# Patient Record
Sex: FEMALE | Race: WHITE | Employment: OTHER | URBAN - METROPOLITAN AREA
[De-identification: names, ages, dates, MRNs, and addresses within clinical notes are randomized per-mention and may not be internally consistent; named-entity substitution may affect disease eponyms.]

---

## 2017-02-08 PROBLEM — E89.0 POSTOPERATIVE HYPOTHYROIDISM: Status: ACTIVE | Noted: 2017-02-08

## 2017-02-08 PROBLEM — E04.2 MULTINODULAR GOITER: Status: ACTIVE | Noted: 2017-02-08

## 2017-04-12 ENCOUNTER — HOSPITAL ENCOUNTER (OUTPATIENT)
Dept: GENERAL RADIOLOGY | Age: 77
Discharge: HOME | End: 2017-04-12
Payer: MEDICARE

## 2017-04-12 DIAGNOSIS — M43.16 SPONDYLOLISTHESIS AT L4-L5 LEVEL: ICD-10-CM

## 2017-04-12 PROCEDURE — 72114 X-RAY EXAM L-S SPINE BENDING: CPT

## 2017-04-12 PROCEDURE — 72110 X-RAY EXAM L-2 SPINE 4/>VWS: CPT

## 2017-05-25 PROBLEM — M21.6X2 ACQUIRED METATARSUS ADDUCTUS OF LEFT FOOT: Status: ACTIVE | Noted: 2017-05-25

## 2017-05-25 PROBLEM — M25.572 SINUS TARSI SYNDROME OF LEFT FOOT: Status: ACTIVE | Noted: 2017-05-25

## 2017-05-31 PROBLEM — M77.50 OTHER ENTHESOPATHY OF UNSPECIFIED FOOT: Status: ACTIVE | Noted: 2017-05-31

## 2017-05-31 PROBLEM — E11.40 CONTROLLED TYPE 2 DIABETES WITH NEUROPATHY (HCC): Status: ACTIVE | Noted: 2017-05-31

## 2017-05-31 PROBLEM — M67.40 GANGLION: Status: ACTIVE | Noted: 2017-05-31

## 2017-05-31 PROBLEM — G57.52 TARSAL TUNNEL SYNDROME, LEFT LOWER LIMB: Status: ACTIVE | Noted: 2017-05-31

## 2017-05-31 PROBLEM — M13.872 OTHER SPECIFIED ARTHRITIS, LEFT ANKLE AND FOOT: Status: ACTIVE | Noted: 2017-05-31

## 2017-05-31 PROBLEM — M76.822 POSTERIOR TIBIAL TENDINITIS OF LEFT LEG: Status: ACTIVE | Noted: 2017-05-31

## 2017-06-17 PROBLEM — R41.3 MEMORY DIFFICULTIES: Status: ACTIVE | Noted: 2017-06-17

## 2017-12-29 ENCOUNTER — HOSPITAL ENCOUNTER (OUTPATIENT)
Dept: MAMMOGRAPHY | Age: 77
Discharge: HOME | End: 2017-12-29
Attending: FAMILY MEDICINE
Payer: MEDICARE

## 2017-12-29 DIAGNOSIS — Z12.31 VISIT FOR SCREENING MAMMOGRAM: ICD-10-CM

## 2017-12-29 PROCEDURE — 77067 SCR MAMMO BI INCL CAD: CPT

## 2018-01-04 PROBLEM — E11.42 CONTROLLED TYPE 2 DIABETES MELLITUS WITH DIABETIC POLYNEUROPATHY, WITHOUT LONG-TERM CURRENT USE OF INSULIN (HCC): Status: ACTIVE | Noted: 2017-05-31

## 2018-03-15 PROBLEM — E11.21 TYPE 2 DIABETES WITH NEPHROPATHY (HCC): Status: ACTIVE | Noted: 2018-03-15

## 2018-03-15 PROBLEM — J45.30 MILD PERSISTENT ASTHMA WITHOUT COMPLICATION: Status: ACTIVE | Noted: 2018-03-15

## 2018-03-15 PROBLEM — L30.9 ECZEMA: Status: ACTIVE | Noted: 2018-03-15

## 2018-06-28 PROBLEM — N18.2 STAGE 2 CHRONIC KIDNEY DISEASE: Status: ACTIVE | Noted: 2018-06-28

## 2018-06-28 PROBLEM — E11.22 TYPE 2 DIABETES MELLITUS WITH CHRONIC KIDNEY DISEASE, WITHOUT LONG-TERM CURRENT USE OF INSULIN (HCC): Status: ACTIVE | Noted: 2018-03-15

## 2018-06-28 PROBLEM — G31.84 MILD COGNITIVE IMPAIRMENT: Status: ACTIVE | Noted: 2018-06-28

## 2018-08-29 PROBLEM — N18.2 TYPE 2 DIABETES MELLITUS WITH STAGE 2 CHRONIC KIDNEY DISEASE, WITHOUT LONG-TERM CURRENT USE OF INSULIN (HCC): Status: ACTIVE | Noted: 2018-03-15

## 2018-08-29 PROBLEM — M21.6X1 ACQUIRED BILATERAL METATARSUS ADDUCTUS: Status: ACTIVE | Noted: 2017-05-25

## 2018-10-22 PROBLEM — R09.89 GLOBUS SENSATION: Status: ACTIVE | Noted: 2018-10-22

## 2018-10-22 PROBLEM — Z23 NEED FOR SHINGLES VACCINE: Status: ACTIVE | Noted: 2018-10-22

## 2018-12-29 PROBLEM — E11.42 CONTROLLED TYPE 2 DIABETES MELLITUS WITH DIABETIC POLYNEUROPATHY, WITHOUT LONG-TERM CURRENT USE OF INSULIN (HCC): Status: RESOLVED | Noted: 2017-05-31 | Resolved: 2018-12-29

## 2019-01-02 PROBLEM — E11.40 TYPE 2 DIABETES MELLITUS WITH DIABETIC NEUROPATHY (HCC): Status: ACTIVE | Noted: 2019-01-02

## 2019-02-02 PROBLEM — M54.50 BILATERAL LOW BACK PAIN WITHOUT SCIATICA: Status: ACTIVE | Noted: 2019-02-02

## 2019-03-03 ENCOUNTER — HOSPITAL ENCOUNTER (EMERGENCY)
Age: 79
Discharge: HOME | End: 2019-03-03
Attending: EMERGENCY MEDICINE
Payer: MEDICARE

## 2019-03-03 ENCOUNTER — APPOINTMENT (OUTPATIENT)
Dept: GENERAL RADIOLOGY | Age: 79
End: 2019-03-03
Attending: EMERGENCY MEDICINE
Payer: MEDICARE

## 2019-03-03 VITALS
DIASTOLIC BLOOD PRESSURE: 58 MMHG | HEART RATE: 80 BPM | OXYGEN SATURATION: 96 % | BODY MASS INDEX: 40.32 KG/M2 | TEMPERATURE: 98.8 F | SYSTOLIC BLOOD PRESSURE: 115 MMHG | WEIGHT: 200 LBS | HEIGHT: 59 IN | RESPIRATION RATE: 18 BRPM

## 2019-03-03 DIAGNOSIS — R11.2 NAUSEA AND VOMITING, INTRACTABILITY OF VOMITING NOT SPECIFIED, UNSPECIFIED VOMITING TYPE: ICD-10-CM

## 2019-03-03 DIAGNOSIS — J06.9 ACUTE URI: Primary | ICD-10-CM

## 2019-03-03 LAB
ALBUMIN SERPL-MCNC: 3.2 G/DL (ref 3.2–4.6)
ALBUMIN/GLOB SERPL: 0.8 {RATIO} (ref 1.2–3.5)
ALP SERPL-CCNC: 58 U/L (ref 50–136)
ALT SERPL-CCNC: 70 U/L (ref 12–65)
ANION GAP SERPL CALC-SCNC: 8 MMOL/L (ref 7–16)
AST SERPL-CCNC: 92 U/L (ref 15–37)
BASOPHILS # BLD: 0 K/UL (ref 0–0.2)
BASOPHILS NFR BLD: 0 % (ref 0–2)
BILIRUB SERPL-MCNC: 0.5 MG/DL (ref 0.2–1.1)
BUN SERPL-MCNC: 19 MG/DL (ref 8–23)
CALCIUM SERPL-MCNC: 9 MG/DL (ref 8.3–10.4)
CHLORIDE SERPL-SCNC: 99 MMOL/L (ref 98–107)
CO2 SERPL-SCNC: 26 MMOL/L (ref 21–32)
CREAT SERPL-MCNC: 1.03 MG/DL (ref 0.6–1)
DIFFERENTIAL METHOD BLD: ABNORMAL
EOSINOPHIL # BLD: 0 K/UL (ref 0–0.8)
EOSINOPHIL NFR BLD: 1 % (ref 0.5–7.8)
ERYTHROCYTE [DISTWIDTH] IN BLOOD BY AUTOMATED COUNT: 16.2 % (ref 11.9–14.6)
GLOBULIN SER CALC-MCNC: 4.2 G/DL (ref 2.3–3.5)
GLUCOSE SERPL-MCNC: 120 MG/DL (ref 65–100)
HCT VFR BLD AUTO: 38.2 % (ref 35.8–46.3)
HGB BLD-MCNC: 13 G/DL (ref 11.7–15.4)
IMM GRANULOCYTES # BLD AUTO: 0 K/UL (ref 0–0.5)
IMM GRANULOCYTES NFR BLD AUTO: 1 % (ref 0–5)
LIPASE SERPL-CCNC: 179 U/L (ref 73–393)
LYMPHOCYTES # BLD: 1.5 K/UL (ref 0.5–4.6)
LYMPHOCYTES NFR BLD: 38 % (ref 13–44)
MCH RBC QN AUTO: 28.9 PG (ref 26.1–32.9)
MCHC RBC AUTO-ENTMCNC: 34 G/DL (ref 31.4–35)
MCV RBC AUTO: 84.9 FL (ref 79.6–97.8)
MONOCYTES # BLD: 0.6 K/UL (ref 0.1–1.3)
MONOCYTES NFR BLD: 16 % (ref 4–12)
NEUTS SEG # BLD: 1.7 K/UL (ref 1.7–8.2)
NEUTS SEG NFR BLD: 43 % (ref 43–78)
NRBC # BLD: 0 K/UL (ref 0–0.2)
PLATELET # BLD AUTO: 213 K/UL (ref 150–450)
PMV BLD AUTO: 9.3 FL (ref 9.4–12.3)
POTASSIUM SERPL-SCNC: 3.4 MMOL/L (ref 3.5–5.1)
PROT SERPL-MCNC: 7.4 G/DL (ref 6.3–8.2)
RBC # BLD AUTO: 4.5 M/UL (ref 4.05–5.2)
SODIUM SERPL-SCNC: 133 MMOL/L (ref 136–145)
WBC # BLD AUTO: 3.9 K/UL (ref 4.3–11.1)

## 2019-03-03 PROCEDURE — 83690 ASSAY OF LIPASE: CPT

## 2019-03-03 PROCEDURE — 74011250636 HC RX REV CODE- 250/636: Performed by: EMERGENCY MEDICINE

## 2019-03-03 PROCEDURE — 85025 COMPLETE CBC W/AUTO DIFF WBC: CPT

## 2019-03-03 PROCEDURE — 80053 COMPREHEN METABOLIC PANEL: CPT

## 2019-03-03 PROCEDURE — 71046 X-RAY EXAM CHEST 2 VIEWS: CPT

## 2019-03-03 PROCEDURE — 96374 THER/PROPH/DIAG INJ IV PUSH: CPT | Performed by: EMERGENCY MEDICINE

## 2019-03-03 PROCEDURE — 99284 EMERGENCY DEPT VISIT MOD MDM: CPT | Performed by: EMERGENCY MEDICINE

## 2019-03-03 RX ORDER — ONDANSETRON 4 MG/1
4 TABLET, ORALLY DISINTEGRATING ORAL
Qty: 10 TAB | Refills: 0 | Status: SHIPPED | OUTPATIENT
Start: 2019-03-03 | End: 2020-07-21

## 2019-03-03 RX ORDER — ONDANSETRON 2 MG/ML
4 INJECTION INTRAMUSCULAR; INTRAVENOUS
Status: COMPLETED | OUTPATIENT
Start: 2019-03-03 | End: 2019-03-03

## 2019-03-03 RX ORDER — BENZONATATE 200 MG/1
200 CAPSULE ORAL
Qty: 30 CAP | Refills: 0 | Status: SHIPPED | OUTPATIENT
Start: 2019-03-03 | End: 2019-03-10

## 2019-03-03 RX ADMIN — SODIUM CHLORIDE 1000 ML: 900 INJECTION, SOLUTION INTRAVENOUS at 21:00

## 2019-03-03 RX ADMIN — ONDANSETRON 4 MG: 2 INJECTION INTRAMUSCULAR; INTRAVENOUS at 21:06

## 2019-03-04 NOTE — DISCHARGE INSTRUCTIONS
Patient Education        Upper Respiratory Infection (Cold): Care Instructions  Your Care Instructions    An upper respiratory infection, or URI, is an infection of the nose, sinuses, or throat. URIs are spread by coughs, sneezes, and direct contact. The common cold is the most frequent kind of URI. The flu and sinus infections are other kinds of URIs. Almost all URIs are caused by viruses. Antibiotics won't cure them. But you can treat most infections with home care. This may include drinking lots of fluids and taking over-the-counter pain medicine. You will probably feel better in 4 to 10 days. The doctor has checked you carefully, but problems can develop later. If you notice any problems or new symptoms, get medical treatment right away. Follow-up care is a key part of your treatment and safety. Be sure to make and go to all appointments, and call your doctor if you are having problems. It's also a good idea to know your test results and keep a list of the medicines you take. How can you care for yourself at home? · To prevent dehydration, drink plenty of fluids, enough so that your urine is light yellow or clear like water. Choose water and other caffeine-free clear liquids until you feel better. If you have kidney, heart, or liver disease and have to limit fluids, talk with your doctor before you increase the amount of fluids you drink. · Take an over-the-counter pain medicine, such as acetaminophen (Tylenol), ibuprofen (Advil, Motrin), or naproxen (Aleve). Read and follow all instructions on the label. · Before you use cough and cold medicines, check the label. These medicines may not be safe for young children or for people with certain health problems. · Be careful when taking over-the-counter cold or flu medicines and Tylenol at the same time. Many of these medicines have acetaminophen, which is Tylenol. Read the labels to make sure that you are not taking more than the recommended dose.  Too much acetaminophen (Tylenol) can be harmful. · Get plenty of rest.  · Do not smoke or allow others to smoke around you. If you need help quitting, talk to your doctor about stop-smoking programs and medicines. These can increase your chances of quitting for good. When should you call for help? Call 911 anytime you think you may need emergency care. For example, call if:    · You have severe trouble breathing.    Call your doctor now or seek immediate medical care if:    · You seem to be getting much sicker.     · You have new or worse trouble breathing.     · You have a new or higher fever.     · You have a new rash.    Watch closely for changes in your health, and be sure to contact your doctor if:    · You have a new symptom, such as a sore throat, an earache, or sinus pain.     · You cough more deeply or more often, especially if you notice more mucus or a change in the color of your mucus.     · You do not get better as expected. Where can you learn more? Go to http://son-cuong.info/. Enter E616 in the search box to learn more about \"Upper Respiratory Infection (Cold): Care Instructions. \"  Current as of: September 5, 2018  Content Version: 11.9  © 4009-0249 [employer]. Care instructions adapted under license by [employer] (which disclaims liability or warranty for this information). If you have questions about a medical condition or this instruction, always ask your healthcare professional. [place] [address] any warranty or liability for your use of this information.

## 2019-03-04 NOTE — ED NOTES
I have reviewed discharge instructions with the patient. The patient verbalized understanding. Patient left ED via Discharge Method: wheelchair to Home with family. Opportunity for questions and clarification provided. Patient given 2 scripts. Medication explained to pt and family and both v\u to medication. Pt in no acute distress at discharge. To continue your aftercare when you leave the hospital, you may receive an automated call from our care team to check in on how you are doing. This is a free service and part of our promise to provide the best care and service to meet your aftercare needs.  If you have questions, or wish to unsubscribe from this service please call [phone]. Thank you for Choosing our [facility] Emergency Department.

## 2019-03-04 NOTE — ED PROVIDER NOTES
The history is provided by the patient and the EMS personnel. Nausea This is a new problem. The current episode started more than 2 days ago. The problem occurs 2 to 4 times per day. The problem has not changed since onset. The emesis has an appearance of stomach contents and clear. There has been no fever. Associated symptoms include diarrhea, cough and URI. Pertinent negatives include no chills, no fever, no sweats, no abdominal pain, no headaches, no arthralgias, no myalgias and no headaches. Risk factors include ill contacts. Her past medical history is significant for DM. Her pertinent negatives include no irritable bowel syndrome, no inflammatory bowel disease, no short gut syndrome, no bowel resection, no recent abdominal surgery, no malabsorption and no gastric bypass. Past Medical History:  
Diagnosis Date  Acquired spondylolisthesis L4-5  Acquired spondylolisthesis of lumbosacral region 4/12/2016  Arthritis  Asthma  Chronic cystitis 1/8/2015  Controlled type 2 diabetes mellitus with diabetic polyneuropathy, without long-term current use of insulin ([address] 5/31/2017  Diabetes mellitus type 2, controlled ([address] 7/8/2009  Dyslipidemia, goal LDL below 70 7/21/2015  Essential hypertension with goal blood pressure less than 130/80 7/8/2009  Fibromyalgia 7/8/2009  GERD (gastroesophageal reflux disease) 7/8/2009  High cholesterol  Hx of thyroid nodule   
 resulting in partial thyroidectomy, managed with medication  Lumbar stenosis with neurogenic claudication  Mitral valve insufficiency 7/27/2015  Morbid obesity with BMI of 40.0-44.9, adult ([address] 12/21/2013  Nausea & vomiting   
 after anesthesia  JUANJOSE on CPAP 5/23/2013 On CPAP, followed by Dr. Greg Flores  Peripheral neuropathy   
 feet, managed with medication  Postmenopausal atrophic vaginitis 1/8/2015  Unstable bladder 7/8/2009  Vitamin D deficiency 8/18/2015 Past Surgical History:  
Procedure Laterality Date  APPENDECTOMY  2007  HX BACK SURGERY  2016 L4-5  
 HX BREAST BIOPSY Right ? right breast cyst removed  HX BREAST LUMPECTOMY Left  left breast benign tumor removed  HX CARPAL TUNNEL RELEASE Bilateral 2009  HX CHOLECYSTECTOMY  HX COLONOSCOPY  2019  HX ENDOSCOPY  2019  HX KNEE REPLACEMENT Bilateral 2009  HX LAP CHOLECYSTECTOMY  1995  
 lap ronald  HX LUMBAR LAMINECTOMY  2005 Repair of L4 & L5 disc  HX ORTHOPAEDIC Right   
 right foot plantar faciitis surgery  HX ORTHOPAEDIC Right   
 right hand/ trigger finger release  HX PARTIAL THYROIDECTOMY Right May, 2013 Right lobe removed for nodules [address]  HX UROLOGICAL    
 bladder band and cystocele repair Family History:  
Problem Relation Age of Onset  Heart Disease Mother  Hypertension Mother  Cancer Mother   
     kidney  Lung Disease Father Emphysema, smoker  Lung Disease Brother COPD, smoker  Breast Cancer Maternal Aunt Social History Socioeconomic History  Marital status:  Spouse name: Not on file  Number of children: Not on file  Years of education: Not on file  Highest education level: Not on file Social Needs  Financial resource strain: Not on file  Food insecurity - worry: Not on file  Food insecurity - inability: Not on file  Transportation needs - medical: Not on file  Transportation needs - non-medical: Not on file Occupational History  Occupation: [employer] Employer: RETIRED Comment: 15 years Tobacco Use  Smoking status: Former Smoker Packs/day: 0.25 Years: 10.00 Pack years: 2.50 Types: Cigarettes Last attempt to quit: 1975 Years since quittin.1  Smokeless tobacco: Never Used Substance and Sexual Activity  Alcohol use: No  
  Alcohol/week: 0.0 oz  
 Frequency: Never  Drug use: No  
 Sexual activity: Yes  
  Partners: Male Other Topics Concern  Not on file Social History Narrative There is no significant environmental or industrial exposure. She had considerable secondhand exposure to tobacco smoke as a child. She has always lived in this general area. She worked in a [address] [place] [address] [employer] for 15 years. ALLERGIES: Adhesive tape; Betadine [povidone-iodine]; Codeine; Morphine; Pcn [penicillins]; and Sulfa (sulfonamide antibiotics) Review of Systems Constitutional: Negative for chills and fever. Respiratory: Positive for cough. Gastrointestinal: Positive for diarrhea and nausea. Negative for abdominal pain. Musculoskeletal: Negative for arthralgias and myalgias. Neurological: Negative for headaches. All other systems reviewed and are negative. Vitals:  
 03/03/19 1947 BP: 105/70 Pulse: 89 Resp: 18 Temp: 97.7 °F (36.5 °C) SpO2: 97% Weight: 90.7 kg (200 lb) Height: 4' 11\" (1.499 m) Physical Exam  
Constitutional: She is oriented to person, place, and time. She appears well-developed and well-nourished. She appears distressed (mild). HENT:  
Head: Normocephalic and atraumatic. Right Ear: External ear normal.  
Left Ear: External ear normal.  
Mouth/Throat: Oropharynx is clear and moist.  
Eyes: Conjunctivae and EOM are normal. Pupils are equal, round, and reactive to light. Neck: Normal range of motion. Neck supple. No thyromegaly present. Cardiovascular: Normal rate, regular rhythm, normal heart sounds and intact distal pulses. Pulmonary/Chest: Effort normal and breath sounds normal. No respiratory distress. She has no wheezes. Abdominal: Soft. Bowel sounds are normal. There is no tenderness. There is no CVA tenderness. No hernia. Musculoskeletal: Normal range of motion. She exhibits no edema. Neurological: She is alert and oriented to person, place, and time. Skin: Skin is warm and dry. Capillary refill takes less than 2 seconds. Psychiatric: She has a normal mood and affect. Nursing note and vitals reviewed. MDM Number of Diagnoses or Management Options Acute URI: new and requires workup Amount and/or Complexity of Data Reviewed Clinical lab tests: ordered and reviewed Tests in the radiology section of CPT®: ordered and reviewed Review and summarize past medical records: yes Risk of Complications, Morbidity, and/or Mortality Presenting problems: moderate Diagnostic procedures: moderate Management options: moderate Patient Progress Patient progress: improved Procedures The patient was observed in the ED. Feeling much improved with Zofran and a liter of fluid. Patient's  is being seen in the other room and is positive for the flu. I suspect the patient is as well, but since she is 5-6 days into her illness, I see no reason to check her for it. Treatment is symptomatic either way. Results Reviewed: 
 
 
Recent Results (from the past 24 hour(s)) CBC WITH AUTOMATED DIFF Collection Time: 03/03/19  7:56 PM  
Result Value Ref Range WBC 3.9 (L) 4.3 - 11.1 K/uL  
 RBC 4.50 4.05 - 5.2 M/uL  
 HGB 13.0 11.7 - 15.4 g/dL HCT 38.2 35.8 - 46.3 % MCV 84.9 79.6 - 97.8 FL  
 MCH 28.9 26.1 - 32.9 PG  
 MCHC 34.0 31.4 - 35.0 g/dL  
 RDW 16.2 (H) 11.9 - 14.6 % PLATELET 013 436 - 688 K/uL MPV 9.3 (L) 9.4 - 12.3 FL ABSOLUTE NRBC 0.00 0.0 - 0.2 K/uL  
 DF AUTOMATED NEUTROPHILS 43 43 - 78 % LYMPHOCYTES 38 13 - 44 % MONOCYTES 16 (H) 4.0 - 12.0 % EOSINOPHILS 1 0.5 - 7.8 % BASOPHILS 0 0.0 - 2.0 % IMMATURE GRANULOCYTES 1 0.0 - 5.0 %  
 ABS. NEUTROPHILS 1.7 1.7 - 8.2 K/UL  
 ABS. LYMPHOCYTES 1.5 0.5 - 4.6 K/UL  
 ABS. MONOCYTES 0.6 0.1 - 1.3 K/UL  
 ABS. EOSINOPHILS 0.0 0.0 - 0.8 K/UL  
 ABS. BASOPHILS 0.0 0.0 - 0.2 K/UL  
 ABS. IMM. GRANS. 0.0 0.0 - 0.5 K/UL METABOLIC PANEL, COMPREHENSIVE  
 Collection Time: 03/03/19  7:56 PM  
Result Value Ref Range Sodium 133 (L) 136 - 145 mmol/L Potassium 3.4 (L) 3.5 - 5.1 mmol/L Chloride 99 98 - 107 mmol/L  
 CO2 26 21 - 32 mmol/L Anion gap 8 7 - 16 mmol/L Glucose 120 (H) 65 - 100 mg/dL BUN 19 8 - 23 MG/DL Creatinine 1.03 (H) 0.6 - 1.0 MG/DL  
 GFR est AA >60 >60 ml/min/1.73m2 GFR est non-AA 55 (L) >60 ml/min/1.73m2 Calcium 9.0 8.3 - 10.4 MG/DL Bilirubin, total 0.5 0.2 - 1.1 MG/DL  
 ALT (SGPT) 70 (H) 12 - 65 U/L  
 AST (SGOT) 92 (H) 15 - 37 U/L Alk. phosphatase 58 50 - 136 U/L Protein, total 7.4 6.3 - 8.2 g/dL Albumin 3.2 3.2 - 4.6 g/dL Globulin 4.2 (H) 2.3 - 3.5 g/dL A-G Ratio 0.8 (L) 1.2 - 3.5 LIPASE Collection Time: 03/03/19  7:56 PM  
Result Value Ref Range Lipase 179 73 - 393 U/L  
 
XR CHEST PA LAT Final Result IMPRESSION:  
  
1. No acute abnormality. I discussed the results of all labs, procedures, radiographs, and treatments with the patient and available family. Treatment plan is agreed upon and the patient is ready for discharge. All voiced understanding of the discharge plan and medication instructions or changes as appropriate. Questions about treatment in the ED were answered. All were encouraged to return should symptoms worsen or new problems develop.

## 2019-03-04 NOTE — ED TRIAGE NOTES
Patient arrives via [facility] complaining of abdominal cramping and nausea for 2 days. Patient denies vomiting, denies diarrhea. Patient denies urinary symptoms. 4mg po zofran given in route. Patient w/ audible wheezing in triage, reports hx asthma; denies sob. Denies chest pain. Patient reports intermittent dry cough.  arrives in ER as well for possible sepsis.

## 2019-07-10 PROBLEM — K29.50 CHRONIC GASTRITIS WITHOUT BLEEDING: Status: ACTIVE | Noted: 2019-07-10

## 2019-07-30 ENCOUNTER — HOSPITAL ENCOUNTER (OUTPATIENT)
Dept: MRI IMAGING | Age: 79
Discharge: HOME | End: 2019-07-30
Attending: FAMILY MEDICINE
Payer: MEDICARE

## 2019-07-30 DIAGNOSIS — F03.90 DEMENTIA WITHOUT BEHAVIORAL DISTURBANCE, UNSPECIFIED DEMENTIA TYPE: ICD-10-CM

## 2019-07-30 PROCEDURE — 70551 MRI BRAIN STEM W/O DYE: CPT

## 2019-07-31 NOTE — PROGRESS NOTES
Nothing really significant here! I think I made a referral to [facility]. If she wants one, AOK. Memory issues.

## 2019-08-07 ENCOUNTER — HOSPITAL ENCOUNTER (OUTPATIENT)
Dept: LAB | Age: 79
Discharge: HOME | End: 2019-08-07
Payer: MEDICARE

## 2019-08-07 DIAGNOSIS — G31.84 MILD COGNITIVE IMPAIRMENT: ICD-10-CM

## 2019-08-07 DIAGNOSIS — E89.0 POSTOPERATIVE HYPOTHYROIDISM: ICD-10-CM

## 2019-08-07 DIAGNOSIS — E74.89 OTHER SPECIFIED DISORDERS OF CARBOHYDRATE METABOLISM (HCC): ICD-10-CM

## 2019-08-07 DIAGNOSIS — E04.2 MULTINODULAR GOITER: ICD-10-CM

## 2019-08-07 LAB
EST. AVERAGE GLUCOSE BLD GHB EST-MCNC: 134 MG/DL
HBA1C MFR BLD: 6.3 % (ref 4.8–6)
TSH SERPL DL<=0.005 MIU/L-ACNC: 2 UIU/ML (ref 0.36–3.74)
VIT B12 SERPL-MCNC: 333 PG/ML (ref 193–986)

## 2019-08-07 PROCEDURE — 82607 VITAMIN B-12: CPT

## 2019-08-07 PROCEDURE — 84443 ASSAY THYROID STIM HORMONE: CPT

## 2019-08-07 PROCEDURE — 82747 ASSAY OF FOLIC ACID RBC: CPT

## 2019-08-07 PROCEDURE — 84425 ASSAY OF VITAMIN B-1: CPT

## 2019-08-07 PROCEDURE — 86376 MICROSOMAL ANTIBODY EACH: CPT

## 2019-08-07 PROCEDURE — 83090 ASSAY OF HOMOCYSTEINE: CPT

## 2019-08-07 PROCEDURE — 36415 COLL VENOUS BLD VENIPUNCTURE: CPT

## 2019-08-07 PROCEDURE — 83036 HEMOGLOBIN GLYCOSYLATED A1C: CPT

## 2019-08-09 LAB
FOLATE BLD-MCNC: 351.4 NG/ML
FOLATE RBC-MCNC: 957 NG/ML
HCT VFR BLD AUTO: 36.7 % (ref 34–46.6)
HCYS SERPL-SCNC: 21.8 UMOL/L (ref 0–15)
THYROPEROXIDASE AB SERPL-ACNC: 13 IU/ML (ref 0–34)

## 2019-08-10 LAB — VIT B1 BLD-SCNC: 99.3 NMOL/L (ref 66.5–200)

## 2019-09-03 ENCOUNTER — HOSPITAL ENCOUNTER (OUTPATIENT)
Dept: ULTRASOUND IMAGING | Age: 79
Discharge: HOME | End: 2019-09-03
Attending: PSYCHIATRY & NEUROLOGY
Payer: MEDICARE

## 2019-09-03 DIAGNOSIS — G31.84 MILD COGNITIVE IMPAIRMENT: ICD-10-CM

## 2019-09-03 PROCEDURE — 93880 EXTRACRANIAL BILAT STUDY: CPT

## 2019-09-23 PROBLEM — Z23 NEED FOR SHINGLES VACCINE: Status: RESOLVED | Noted: 2018-10-22 | Resolved: 2019-09-23

## 2019-09-23 NOTE — THERAPY EVALUATION
Christopher Barbosa Matthew  : 1940  [place]  Secondary: [address] at [facility], [facility], [address]  [phone]   [phone]        OUTPATIENT SPEECH LANGUAGE PATHOLOGY: Initial Assessment  ICD-10: Treatment Diagnosis: dysphagia, pharygneal R 13.13  REFERRING PHYSICIAN: SHAW Calvert MD Orders: speech evaluate and treat  PAST MEDICAL HISTORY:    Ms. Kane Horn is a 70 y.o. female who  has a past medical history of Acquired spondylolisthesis, Acquired spondylolisthesis of lumbosacral region (2016), Arthritis, Asthma, Chronic cystitis (2015), Controlled type 2 diabetes mellitus with diabetic polyneuropathy, without long-term current use of insulin ([facility] (2017), Diabetes mellitus type 2, controlled ([address] (2009), Dyslipidemia, goal LDL below 70 (2015), Essential hypertension with goal blood pressure less than 130/80 (2009), Fibromyalgia (2009), GERD (gastroesophageal reflux disease) (2009), High cholesterol, thyroid nodule, Lumbar stenosis with neurogenic claudication, Mitral valve insufficiency (2015), Morbid obesity with BMI of 40.0-44.9, adult ([address] (2013), Nausea & vomiting, JUANJOSE on CPAP (2013), Peripheral neuropathy, Postmenopausal atrophic vaginitis (2015), Unstable bladder (2009), and Vitamin D deficiency (2015).   She also  has a past surgical history that includes hx carpal tunnel release (Bilateral, ); pr appendectomy (); hx partial thyroidectomy (Right, May, 2013); hx lap cholecystectomy (); hx lumbar laminectomy (); hx polly and bso (); hx cholecystectomy; hx breast lumpectomy (Left, ); hx breast biopsy (Right, ?); hx knee replacement (Bilateral, ); hx orthopaedic (Right, ); hx orthopaedic (Right, ); hx back surgery (2016); hx urological (); hx colonoscopy (2019); and hx endoscopy (02/2019). MEDICAL/REFERRING DIAGNOSIS: Dysphagia, oropharyngeal [R13.12]  DATE OF ONSET: over a year ago per her spouse  PRIOR LEVEL OF FUNCTION: with spouse  PRECAUTIONS/ALLERGIES: Adhesive tape; Betadine [povidone-iodine]; Codeine; Morphine; Pcn [penicillins]; and Sulfa (sulfonamide antibiotics)     ASSESSMENT:  Mrs. Beatris Pacheco is a 71 y/o female referred to  due to dysphagia. She reported she has trouble swallowing and she was going to be sent somewhere for that. Informed pt she is here this date for a bedside swallowing evaluation. Also noted she is scheduled for a barium swallow 10/2. She reported she wasn't sure why she was coming here today. She reported she feels like she is choking with everything. She even chokes when she's sitting still and not doing anything. She doesn't feel she is choking on saliva as she has xerostomia a lot. She reported she currently feels like she's choking though no coughing or difficulties observed. She stated \"nothing helps it\" when she chokes. The pt's spouse said the pt's coughing episodes scare him. He reported she can be sitting in her chair not doing anything and she will begin coughing. Then she will get a drink of water and it will subside. He also stated sometimes potato chips will make the pt cough. He reported Cheetos don't seem to bother her as badly as potato chips do. The pt reported she is taking her Omeprazole once daily. However, Angelika Emerson notes revealed recommendations were for her to take it twice daily. She reported she was unaware she is supposed to take it twice. However, her  said he knew that. Based on the objective data described below, the patient presents with possible dysphagia. An oral motor evaluation revealed no deficits. Pt was given trials thin liquids, mixed consistencies and solids. Mastication was adequate. No signs/sx aspiration observed.   Feel pt's deficits may be esophageal related as she reported she coughs without po. Also, her  reported drinking liquids helps eliminate the pt's coughing. Will await barium swallow study results. Recommend a MBS if indicated pending barium swallow study results. Patient will benefit from skilled intervention to address the below impairments. ?????? ? ? This section established at most recent assessment??????????  PROBLEM LIST (Impairments causing functional limitations):  1. Dysphagia   GOALS: (Goals have been discussed and agreed upon with patient.)  SHORT-TERM FUNCTIONAL GOALS: Time Frame: 3 months  Pt will participate in a MBS if indicated pending barium swallow study results. DISCHARGE GOALS: Time Frame: 4-5 months   1. Pt will tolerate least restrictive diet without signs/sx aspiration 100% for safe swallow function. REHABILITATION POTENTIAL FOR STATED GOALS: GoodPLAN OF CARE:  Patient will benefit from skilled intervention to address the following impairments. RECOMMENDATIONS AND PLANNED INTERVENTIONS (Benefits and precautions of therapy have been discussed with the patient.):  · continue prescribed diet  MEDICATIONS:  · With liquid  COMPENSATORY STRATEGIES/MODIFICATIONS INCLUDING:  · None  OTHER RECOMMENDATIONS (including follow up treatment recommendations):   · MBS if indicated pending barium swallow study results  RECOMMENDED DIET MODIFICATIONS DISCUSSED WITH:  · Family  · Patient  TREATMENT PLAN EFFECTIVE DATES: 9/24/2019 TO 12/23/2019 (90 days). FREQUENCY/DURATION: Continue to follow patient 1 time a week for 90 days to address above goals if indicated pending barium swallow and MBS results if indicated. Regarding [facility] therapy, I certify that the treatment plan above will be carried out by a therapist or under their direction. Thank you for this referral,  Jose F Stern, [facility], [facility] [place], [address] [facility]                    Referring Physician Signature: SHAW Polanco    Date      SUBJECTIVE:  Pt cooperative.   Spouse present. Present Symptoms: coughing       Current Medications: see hard chart    Date Last Reviewed: 9/24/19  Current Dietary Status:  Regular       History of reflux:  YES    Reflux medication: Omeprazole  Social History/Home Situation: with spouse      Work/Activity History: retired     OBJECTIVE:  Objective Measure: Tool Used: National Outcomes Measurement System: Functional Communication Measures: SWALLOWING  Score:  Initial: 6 Most Recent: X (Date: -- )   Interpretation of Tool: This measure describes the change in functional communication status subsequent to speech-language pathology treatment of patients with dysphagia.  o Level 1:  Individual is not able to swallow anything safely by mouth. All nutrition and hydration is received through non-oral means (e.g., nasogastric tube, PEG). o Level 2: Individual is not able to swallow safely by mouth for nutrition and hydration, but may take some consistency with consistent maximal cues in therapy only. Alternative method of feeding required. o Level 3:  Alternative method of feeding required as individual takes less than 50% of nutrition and hydration by mouth, and/or swallowing is safe with consistent use of moderate cues to use compensatory strategies and/or requires maximum diet restriction. o Level 4:  Swallowing is safe, but usually requires moderate cues to use compensatory strategies, and/or the individual has moderate diet restrictions and/or still requires tube feeding and/or oral supplements. o Level 5:  Swallowing is safe with minimal diet restriction and/or occasionally requires minimal cueing to use compensatory strategies. The individual may occasionally self-cue. All nutrition and hydration needs are met by mouth at mealtime. o Level 6:  Swallowing is safe, and the individual eats and drinks independently and may rarely require minimal cueing. The individual usually self-cues when difficulty occurs.  May need to avoid specific food items (e.g., popcorn and nuts), or require additional time (due to dysphagia). o Level 7: The individuals ability to eat independently is not limited by swallow function. Swallowing would be safe and efficient for all consistencies. Compensatory strategies are effectively used when needed. Score Level 7 Level 6 Level 5 Level 4 Level 3 Level 2 Level 1   Modifier CH CI CJ CK CL CM CN     Oral Motor Structure/Speech:  Oral-Motor Structure/Motor Speech  Labial: No impairment  Dentition: Upper & lower dentures  Oral Hygiene: adequate  Lingual: No impairment    Cognitive and Communication Status:  Neurologic State: Alert                   BEDSIDE SWALLOW EVALUATION  Oral Assessment:  Oral Assessment  Labial: No impairment  Dentition: Upper & lower dentures  Oral Hygiene: adequate  Lingual: No impairment  P.O. Trials:  Patient Position: upright in chair    The patient was given teaspoon to cup amounts of the following:   Consistency Presented: Mixed consistency; Solid; Thin liquid  How Presented: Self-fed/presented;Cup/sip;Spoon; Other (comment)    ORAL PHASE:  Bolus Acceptance: No impairment  Bolus Formation/Control: No impairment  Propulsion: No impairment     Oral Residue: None    PHARYNGEAL PHASE:  Initiation of Swallow: No impairment  Laryngeal Elevation: Functional  Aspiration Signs/Symptoms: None  Vocal Quality: Hoarse                OTHER OBSERVATIONS:  Rate/bite size: WNL   Endurance: WNL     TREATMENT:    (In addition to Assessment/Re-Assessment sessions the following treatments were rendered)  Assessment only;  No treatment(s) provided today     MODALITIES:                                                                    ORAL MOTOR  EXERCISES:                                                                                                                                                                      LARYNGEAL / PHARYNGEAL EXERCISES: __________________________________________________________________________________________________  Treatment Assessment:   . Progression/Medical Necessity:   · Skilled intervention continues to be required due to medical complications. Compliance with Program/Exercises: Will assess as treatment progresses. Reason for Continuation of Services/Other Comments:  · Patient continues to require skilled intervention due to dysphagia. Recommendations/Intent for next treatment session: \"Treatment next visit will focus on completion of MBS if indicated pending barium swallow study results\".      Total Treatment Duration:  Time In: 1320  Time Out: BUSHRA Ring, CCC-SLP

## 2019-09-24 ENCOUNTER — HOSPITAL ENCOUNTER (OUTPATIENT)
Dept: PHYSICAL THERAPY | Age: 79
Discharge: HOME | End: 2019-09-24
Payer: MEDICARE

## 2019-09-24 PROCEDURE — 92610 EVALUATE SWALLOWING FUNCTION: CPT

## 2019-10-02 ENCOUNTER — HOSPITAL ENCOUNTER (OUTPATIENT)
Dept: GENERAL RADIOLOGY | Age: 79
Discharge: HOME | End: 2019-10-02
Attending: PHYSICIAN ASSISTANT
Payer: MEDICARE

## 2019-10-02 VITALS — BODY MASS INDEX: 40.32 KG/M2 | WEIGHT: 200 LBS | HEIGHT: 59 IN

## 2019-10-02 DIAGNOSIS — R13.12 OROPHARYNGEAL DYSPHAGIA: ICD-10-CM

## 2019-10-02 PROCEDURE — 74011000255 HC RX REV CODE- 255: Performed by: PHYSICIAN ASSISTANT

## 2019-10-02 PROCEDURE — 74220 X-RAY XM ESOPHAGUS 1CNTRST: CPT

## 2019-10-02 PROCEDURE — 74011000250 HC RX REV CODE- 250: Performed by: PHYSICIAN ASSISTANT

## 2019-10-02 RX ADMIN — BARIUM SULFATE 700 MG: 700 TABLET ORAL at 08:32

## 2019-10-02 RX ADMIN — BARIUM SULFATE 355 ML: 0.6 SUSPENSION ORAL at 08:33

## 2019-10-02 RX ADMIN — ANTACID/ANTIFLATULENT 4 G: 380; 550; 10; 10 GRANULE, EFFERVESCENT ORAL at 08:32

## 2019-10-02 RX ADMIN — BARIUM SULFATE 135 ML: 980 POWDER, FOR SUSPENSION ORAL at 08:34

## 2019-10-16 NOTE — PROGRESS NOTES
Outpatient Rehab Services  Referral Form/Physician Order  Central Scheduling Fax: [phone]  Speak to a :  Call [phone]   Please review and co-sign (electronically) OR sign and return to the below indicated clinic's fax number if you agree with this request.  Thank you! NAME:  Zack Mckeon SSN:  [id number] :  1940   ADDRESS:  [place] [address]. [address] Daytime Phone:  [phone] (PW)[phone] (mobile)    Diagnosis & Date of Onset:  Dysphagia, oropharyngeal [R13.12] Special Precautions:   Frequency:  [x] to be determined by therapist after evaluation   OR   ____ X per week X ____ weeks    Services    [] Evaluate & Treat  [] Special Orders________________________   [] Physical Therapy  [] Occupational Therapy   [] Speech Therapy  [x] MBS w/ Speech Therapy    Specialized Programs    [] Aquatic Therapy [] Lymphedema [] Oncology Rehab   [] Balance Rehab [] Parkinson's Program [] Osteoporosis   [] Fibromyalgia [] Motion Analysis [] Spine Rehab   [] Industrial Rehab [] Hand & Upper Extremity [] Sports Injury Rehab    [] Urinary Incontinence     Locations    @ [facility], [address]  [place] [address] [address] [facility]  Ph: [phone]  Fax: [phone] @ [address] [place] [address] [address] [facility]  [place], [address]  Ph: [phone]  Fax: [phone] @ [address], [address] [place] [address]  Ph: [phone]  Fax: [phone]        @ [address] [place] [address] Aylin Weber 2  Ph: [phone]  Fax: [phone] @ [address] [place] [address] [facility], [address]   Ph: [phone]  Fax: [phone] @ [address] [place] [address]  [facility]  [place], [address]  Ph: [phone]  Fax: [phone]        @ [address]  Ph: [phone]  Fax: [phone] @ Kristian [address] [place] [address], [address] [place] [address]  Ph: [phone]  Fax: [phone] @ [address] [facility]  [address] [place] [address], [address]  [address]   Ph: [phone]         @ [address] [place] [address]  Ph: [phone]  Fax: [phone]      This section is not needed if signing electronically   I certify that I have examined the above patient and outpatient rehab services are medically necessary.    ___________________________________________           Signature of Physician/Provider    ___________________________________________            Practice Name   ______________________   Date    ______________________   Referral Contact

## 2020-01-19 PROBLEM — F03.90 DEMENTIA WITHOUT BEHAVIORAL DISTURBANCE (HCC): Status: ACTIVE | Noted: 2020-01-19

## 2020-01-19 PROBLEM — F03.90 DEMENTIA (HCC): Status: ACTIVE | Noted: 2020-01-19

## 2020-01-19 PROBLEM — N18.2 TYPE 2 DIABETES MELLITUS WITH STAGE 2 CHRONIC KIDNEY DISEASE, WITHOUT LONG-TERM CURRENT USE OF INSULIN (HCC): Status: RESOLVED | Noted: 2018-03-15 | Resolved: 2020-01-19

## 2020-01-19 PROBLEM — E11.22 TYPE 2 DIABETES MELLITUS WITH STAGE 2 CHRONIC KIDNEY DISEASE, WITHOUT LONG-TERM CURRENT USE OF INSULIN (HCC): Status: RESOLVED | Noted: 2018-03-15 | Resolved: 2020-01-19

## 2020-01-19 PROBLEM — E72.11 HOMOCYSTINEMIA (HCC): Status: ACTIVE | Noted: 2020-01-19

## 2020-01-19 PROBLEM — N18.2 STAGE 2 CHRONIC KIDNEY DISEASE: Status: RESOLVED | Noted: 2018-06-28 | Resolved: 2020-01-19

## 2020-02-11 NOTE — THERAPY DISCHARGE
Ortega Browne Xavier  : 1940  [place]  Secondary: Jorje Troncoso 75 at [facility], [address] \[facility] [place] [facility]\"", [address]  [phone]   [phone]        OUTPATIENT SPEECH LANGUAGE PATHOLOGY: Discontinuation Summary  ICD-10: Treatment Diagnosis: dysphagia, pharygneal R 13.13  REFERRING PHYSICIAN: Artis Lafleur PA MD Orders: speech evaluate and treat  PAST MEDICAL HISTORY:    Ms. Beatris Pacheco is a 70 y.o. female who  has a past medical history of Acquired spondylolisthesis, Acquired spondylolisthesis of lumbosacral region (2016), Arthritis, Asthma, Chronic cystitis (2015), Controlled type 2 diabetes mellitus with diabetic polyneuropathy, without long-term current use of insulin ([facility] (2017), Diabetes mellitus type 2, controlled ([facility] (2009), Dyslipidemia, goal LDL below 70 (2015), Essential hypertension with goal blood pressure less than 130/80 (2009), Fibromyalgia (2009), GERD (gastroesophageal reflux disease) (2009), High cholesterol, thyroid nodule, Lumbar stenosis with neurogenic claudication, Mitral valve insufficiency (2015), Morbid obesity with BMI of 40.0-44.9, adult ([facility] (2013), Nausea & vomiting, JUANJOSE on CPAP (2013), Peripheral neuropathy, Postmenopausal atrophic vaginitis (2015), Unstable bladder (2009), and Vitamin D deficiency (2015).   She also  has a past surgical history that includes hx carpal tunnel release (Bilateral, ); pr appendectomy (); hx partial thyroidectomy (Right, May, 2013); hx lap cholecystectomy (); hx lumbar laminectomy (); hx polly and bso (); hx cholecystectomy; hx breast lumpectomy (Left, ); hx breast biopsy (Right, ?); hx knee replacement (Bilateral, 2009); hx orthopaedic (Right, ); hx orthopaedic (Right, ); hx back surgery (2016); hx urological (); hx colonoscopy (02/2019); and hx endoscopy (02/2019). MEDICAL/REFERRING DIAGNOSIS: Dysphagia, oropharyngeal [R13.12]  DATE OF ONSET: over a year ago per her spouse  PRIOR LEVEL OF FUNCTION: with spouse  PRECAUTIONS/ALLERGIES: Adhesive tape; Betadine [povidone-iodine]; Codeine; Morphine; Pcn [penicillins]; and Sulfa (sulfonamide antibiotics)     ASSESSMENT:  Mrs. David Pittman attended the evaluation 9/24/19. A MBS was recommended. Orders were requested numerous times but never received. Therefore, will DC at this time. If pt is still exhibiting dysphagia, would benefit from a MBS.      ?????? ? ? This section established at most recent assessment??????????  PROBLEM LIST (Impairments causing functional limitations):  1. Dysphagia   GOALS: (Goals have been discussed and agreed upon with patient.)  SHORT-TERM FUNCTIONAL GOALS:   Pt will participate in a MBS if indicated pending barium swallow study results. DISCHARGE GOALS:   1. Pt will tolerate least restrictive diet without signs/sx aspiration 100% for safe swallow function. PLAN OF CARE:  Discharge from [address] [place], [place] [address]Jazmín Thank you for this referral,  Nia Pena, MSP, CCC-SLP      SUBJECTIVE:  Pt cooperative for evaluation. OBJECTIVE:  Objective Measure: Tool Used: National Outcomes Measurement System: Functional Communication Measures: SWALLOWING  Score:  Initial: 6 Most Recent: X (Date: -- )   Interpretation of Tool: This measure describes the change in functional communication status subsequent to speech-language pathology treatment of patients with dysphagia.  o Level 1:  Individual is not able to swallow anything safely by mouth. All nutrition and hydration is received through non-oral means (e.g., nasogastric tube, PEG). o Level 2: Individual is not able to swallow safely by mouth for nutrition and hydration, but may take some consistency with consistent maximal cues in therapy only. Alternative method of feeding required.   o Level 3:  Alternative method of feeding required as individual takes less than 50% of nutrition and hydration by mouth, and/or swallowing is safe with consistent use of moderate cues to use compensatory strategies and/or requires maximum diet restriction. o Level 4:  Swallowing is safe, but usually requires moderate cues to use compensatory strategies, and/or the individual has moderate diet restrictions and/or still requires tube feeding and/or oral supplements. o Level 5:  Swallowing is safe with minimal diet restriction and/or occasionally requires minimal cueing to use compensatory strategies. The individual may occasionally self-cue. All nutrition and hydration needs are met by mouth at mealtime. o Level 6:  Swallowing is safe, and the individual eats and drinks independently and may rarely require minimal cueing. The individual usually self-cues when difficulty occurs. May need to avoid specific food items (e.g., popcorn and nuts), or require additional time (due to dysphagia). o Level 7: The individuals ability to eat independently is not limited by swallow function. Swallowing would be safe and efficient for all consistencies. Compensatory strategies are effectively used when needed.   Score Level 7 Level 6 Level 5 Level 4 Level 3 Level 2 Level 1   Modifier CH CI CJ CK CL CM CN       Tanesha Castro, BUSHRA, CCC-SLP

## 2020-02-27 ENCOUNTER — HOSPITAL ENCOUNTER (OUTPATIENT)
Dept: GENERAL RADIOLOGY | Age: 80
Discharge: HOME | End: 2020-02-27
Payer: MEDICARE

## 2020-02-27 DIAGNOSIS — R13.10 DYSPHAGIA: ICD-10-CM

## 2020-02-27 DIAGNOSIS — R13.12 OROPHARYNGEAL DYSPHAGIA: ICD-10-CM

## 2020-02-27 PROCEDURE — 74230 X-RAY XM SWLNG FUNCJ C+: CPT

## 2020-02-27 PROCEDURE — 92611 MOTION FLUOROSCOPY/SWALLOW: CPT

## 2020-02-27 PROCEDURE — 74011000255 HC RX REV CODE- 255: Performed by: INTERNAL MEDICINE

## 2020-02-27 RX ADMIN — BARIUM SULFATE 15 ML: 400 PASTE ORAL at 09:51

## 2020-02-27 RX ADMIN — BARIUM SULFATE 45 ML: 980 POWDER, FOR SUSPENSION ORAL at 09:51

## 2020-02-27 NOTE — THERAPY EVALUATION
Rory Andrea Lingerfelt  : 1940  [place]  Secondary: Jorje Troncoso 75 at [address] [facility], [address] [facility] [place], [address]  [phone]   [phone]       OUTPATIENT SPEECH LANGUAGE PATHOLOGY: MODIFIED BARIUM SWALLOW    ICD-10: Treatment Diagnosis: Oropharyngeal dysphagia (R13.12)  DATE: 2020  REFERRING PHYSICIAN: Leigh Ann Vogel MD MD Orders: Modifed Barium Swallow  PAST MEDICAL HISTORY:   Ms. Camila Graham is a 70 y.o. female who  has a past medical history of Acquired spondylolisthesis, Acquired spondylolisthesis of lumbosacral region (2016), Arthritis, Asthma, Chronic cystitis (2015), Controlled type 2 diabetes mellitus with diabetic polyneuropathy, without long-term current use of insulin ([facility] (2017), Diabetes mellitus type 2, controlled ([facility] (2009), Dyslipidemia, goal LDL below 70 (2015), Essential hypertension with goal blood pressure less than 130/80 (2009), Fibromyalgia (2009), GERD (gastroesophageal reflux disease) (2009), High cholesterol, thyroid nodule, Lumbar stenosis with neurogenic claudication, Mitral valve insufficiency (2015), Morbid obesity with BMI of 40.0-44.9, adult ([address] (2013), Nausea & vomiting, JUANJOSE on CPAP (2013), Peripheral neuropathy, Postmenopausal atrophic vaginitis (2015), Unstable bladder (2009), and Vitamin D deficiency (2015).   She also  has a past surgical history that includes hx carpal tunnel release (Bilateral, ); pr appendectomy (); hx partial thyroidectomy (Right, May, 2013); hx lap cholecystectomy (); hx lumbar laminectomy (); hx polly and bso (); hx cholecystectomy; hx breast lumpectomy (Left, ); hx breast biopsy (Right, ?); hx knee replacement (Bilateral, ); hx orthopaedic (Right, ); hx orthopaedic (Right, ); hx back surgery (2016); hx urological (); hx colonoscopy (02/2019); and hx endoscopy (02/2019). RADIOLOGIST:  Dr. Eugenie Lopez  MEDICAL/REFERRING DIAGNOSIS: Dysphagia [R13.10]  PRECAUTIONS/ALLERGIES: Adhesive tape; Betadine [povidone-iodine]; Codeine; Morphine; Pcn [penicillins]; and Sulfa (sulfonamide antibiotics)   ASSESSMENT/PLAN OF CARE:Based on the objective data described above, Ms. Xuan Hamilton presents with oropharyngeal swallow within functional limits. Swallows of all consistencies were timely, with adequate laryngeal excursion and pharyngeal constriction. There was no laryngeal penetration or aspiration with any texture and pharynx was clear after all swallows. RECOMMENDATIONS AND PLANNED INTERVENTIONS  DIET:    continue prescribed diet  MEDICATIONS: With liquid    COMPENSATORY STRATEGIES/MODIFICATIONS INCLUDING:  · Upright for all PO  · Alternate liquids/solids  · Small bites and sips  · Remain upright for 20-30 min after any PO  OTHER RECOMMENDATIONS (including follow up treatment recommendations): · Refer back to GI for further management of esophageal phase of swallow    Thank you for this referral,  Brendan Han MA, CCC-SLP        SUBJECTIVE:Patient pleasant and cooperative. Present Dysphagia Symptoms: She currently complains of globus sensation with solids and liquids. Current dietary status prior to evaluation today:  Regular textures, thin liquids  Previous Dysphagia: She has a history of presbyesophagus and esophageal dysmotility per barium swallow performed September 2019. OBJECTIVE:Orientation:    Person   Place   Time   Situation    Oral Assessment:  Labial: No impairment  Dentition: Upper and Lower Dentures  Oral Hygiene: Adequate  Lingual: No impairment  Vocal Quality: WFL  Speech Inteligiblity: WFL    Modified barium swallow study was performed in the radiology suite with Ms. Xuan Hamilton in the lateral plane seated upright 90° in the [facility] chair.  To evaluate her swallow function, barium coated liquid and food was administered in the form of thin liquids (by spoon, cup sip, cup gulp, straw sip and serial swallows), pudding, mixed consistency and cracker. Oral phase of swallow:    no significant oral issues observed    Pharyngeal phase of swallow:    Swallows of thin liquids were timely. No laryngeal penetration or aspiration was observed. No pharyngeal residue after swallow.  Swallows of pudding were timely. No laryngeal penetration or aspiration was observed. No pharyngeal residue after swallow.  Swallows of mixed consistencies were triggered at valleculae. No laryngeal penetration or aspiration was observed. No pharyngeal residue after swallow.  Swallows of cracker were timely. No laryngeal penetration or aspiration was observed. No pharyngeal residue after swallow. Pharyngeal characteristics:   functional pharyngeal swallow   adequate retraction of base of tongue   adequate hyolaryngeal elevation/excursion   adequate epiglottic inversion   adequate constriction of posterior pharyngeal wall   adequate laryngeal closure  Attempted strategies:    none  Effective strategies:    not applicable  Aspiration/Penetration Scale: 1 (No penetration/aspiration. Contrast does not enter the airway.)    Cervical esophageal phase of swallow:    decreased relaxation of upper segment of esophagus that does not affect flow  **Distal esophagus not assessed due to limitations of MBS study. **    Assessment only; no treatment provided today. Objective Measure: Tool Used: National Outcomes Measurement System: Functional Communication Measures: SWALLOWING  Score:  Initial: 6     Interpretation of Tool: This measure describes the change in functional communication status subsequent to speech-language pathology treatment of patients with dysphagia.  Level 1:  Individual is not able to swallow anything safely by mouth. All nutrition and hydration is received through non-oral means (e.g., nasogastric tube, PEG).    Level 2: Individual is not able to swallow safely by mouth for nutrition and hydration, but may take some consistency with consistent maximal cues in therapy only. Alternative method of feeding required.  Level 3:  Alternative method of feeding required as individual takes less than 50% of nutrition and hydration by mouth, and/or swallowing is safe with consistent use of moderate cues to use compensatory strategies and/or requires maximum diet restriction.  Level 4:  Swallowing is safe, but usually requires moderate cues to use compensatory strategies, and/or the individual has moderate diet restrictions and/or still requires tube feeding and/or oral supplements.  Level 5:  Swallowing is safe with minimal diet restriction and/or occasionally requires minimal cueing to use compensatory strategies. The individual may occasionally self-cue. All nutrition and hydration needs are met by mouth at mealtime.  Level 6:  Swallowing is safe, and the individual eats and drinks independently and may rarely require minimal cueing. The individual usually self-cues when difficulty occurs. May need to avoid specific food items (e.g., popcorn and nuts), or require additional time (due to dysphagia).  Level 7: The individuals ability to eat independently is not limited by swallow function. Swallowing would be safe and efficient for all consistencies. Compensatory strategies are effectively used when needed. Patient/Family education:    The patient was educated on the following topics: anatomy and physiology of the swallowing mechanism and results and recommendations from this assessment. All questions were answered at this time and comprehension of education was expressed by the patient.        Total Treatment Duration:  Time In: 0930   Time Out: BE Hart, CCC-SLP

## 2020-07-21 PROBLEM — Z79.899 HIGH RISK MEDICATION USE: Status: ACTIVE | Noted: 2020-07-21

## 2020-07-21 PROBLEM — M12.811 ROTATOR CUFF ARTHROPATHY OF RIGHT SHOULDER: Status: ACTIVE | Noted: 2020-01-17

## 2020-07-21 PROBLEM — M65.351 TRIGGER LITTLE FINGER OF RIGHT HAND: Status: ACTIVE | Noted: 2017-03-21

## 2020-07-21 PROBLEM — F03.90 DEMENTIA WITHOUT BEHAVIORAL DISTURBANCE (HCC): Status: ACTIVE | Noted: 2018-06-28

## 2020-07-21 PROBLEM — M75.41 IMPINGEMENT SYNDROME OF RIGHT SHOULDER: Status: ACTIVE | Noted: 2020-01-17

## 2021-01-21 PROBLEM — E72.11 HOMOCYSTINEMIA (HCC): Status: RESOLVED | Noted: 2020-01-19 | Resolved: 2021-01-21

## 2021-03-16 PROBLEM — I35.1 NONRHEUMATIC AORTIC VALVE INSUFFICIENCY: Status: ACTIVE | Noted: 2020-11-30

## 2021-03-16 PROBLEM — I20.2 REFRACTORY ANGINA: Status: ACTIVE | Noted: 2020-11-30

## 2021-03-16 PROBLEM — E78.1 HYPERTRIGLYCERIDEMIA: Status: ACTIVE | Noted: 2021-03-16

## 2021-03-16 PROBLEM — R06.09 DYSPNEA ON EXERTION: Status: ACTIVE | Noted: 2020-11-30

## 2021-03-16 PROBLEM — Z79.899 HIGH RISK MEDICATION USE: Status: ACTIVE | Noted: 2021-03-16

## 2021-07-23 PROBLEM — J45.30 MILD PERSISTENT ASTHMA WITHOUT COMPLICATION: Status: ACTIVE | Noted: 2021-03-02

## 2021-07-23 PROBLEM — I20.2 REFRACTORY ANGINA: Status: RESOLVED | Noted: 2020-11-30 | Resolved: 2021-07-23

## 2022-03-18 PROBLEM — E89.0 POSTOPERATIVE HYPOTHYROIDISM: Status: ACTIVE | Noted: 2017-02-08

## 2022-03-18 PROBLEM — M75.41 IMPINGEMENT SYNDROME OF RIGHT SHOULDER: Status: ACTIVE | Noted: 2020-01-17

## 2022-03-18 PROBLEM — E11.40 TYPE 2 DIABETES MELLITUS WITH DIABETIC NEUROPATHY, WITHOUT LONG-TERM CURRENT USE OF INSULIN (HCC): Status: ACTIVE | Noted: 2019-01-02

## 2022-03-18 PROBLEM — M67.40 GANGLION: Status: ACTIVE | Noted: 2017-05-31

## 2022-03-18 PROBLEM — M54.50 BILATERAL LOW BACK PAIN WITHOUT SCIATICA: Status: ACTIVE | Noted: 2019-02-02

## 2022-03-18 PROBLEM — L30.9 ECZEMA: Status: ACTIVE | Noted: 2018-03-15

## 2022-03-18 PROBLEM — F03.90 DEMENTIA WITHOUT BEHAVIORAL DISTURBANCE (HCC): Status: ACTIVE | Noted: 2018-06-28

## 2022-03-19 PROBLEM — R06.09 DYSPNEA ON EXERTION: Status: ACTIVE | Noted: 2020-11-30

## 2022-03-19 PROBLEM — M76.822 POSTERIOR TIBIAL TENDINITIS OF LEFT LEG: Status: ACTIVE | Noted: 2017-05-31

## 2022-03-19 PROBLEM — M21.6X1 ACQUIRED BILATERAL METATARSUS ADDUCTUS: Status: ACTIVE | Noted: 2017-05-25

## 2022-03-19 PROBLEM — I35.1 NONRHEUMATIC AORTIC VALVE INSUFFICIENCY: Status: ACTIVE | Noted: 2020-11-30

## 2022-03-19 PROBLEM — E78.1 HYPERTRIGLYCERIDEMIA: Status: ACTIVE | Noted: 2021-03-16

## 2022-03-19 PROBLEM — M12.811 ROTATOR CUFF ARTHROPATHY OF RIGHT SHOULDER: Status: ACTIVE | Noted: 2020-01-17

## 2022-03-19 PROBLEM — M65.351 TRIGGER LITTLE FINGER OF RIGHT HAND: Status: ACTIVE | Noted: 2017-03-21

## 2022-03-19 PROBLEM — K29.50 CHRONIC GASTRITIS WITHOUT BLEEDING: Status: ACTIVE | Noted: 2019-07-10

## 2022-03-19 PROBLEM — M21.6X2 ACQUIRED BILATERAL METATARSUS ADDUCTUS: Status: ACTIVE | Noted: 2017-05-25

## 2022-03-19 PROBLEM — Z79.899 HIGH RISK MEDICATION USE: Status: ACTIVE | Noted: 2021-03-16

## 2022-03-19 PROBLEM — R09.89 GLOBUS SENSATION: Status: ACTIVE | Noted: 2018-10-22

## 2022-03-19 PROBLEM — G57.52 TARSAL TUNNEL SYNDROME, LEFT LOWER LIMB: Status: ACTIVE | Noted: 2017-05-31

## 2022-03-19 PROBLEM — M25.572 SINUS TARSI SYNDROME OF LEFT FOOT: Status: ACTIVE | Noted: 2017-05-25

## 2022-03-19 PROBLEM — R09.A2 GLOBUS SENSATION: Status: ACTIVE | Noted: 2018-10-22

## 2022-03-19 PROBLEM — E04.2 MULTINODULAR GOITER: Status: ACTIVE | Noted: 2017-02-08

## 2022-03-20 PROBLEM — M13.872 OTHER SPECIFIED ARTHRITIS, LEFT ANKLE AND FOOT: Status: ACTIVE | Noted: 2017-05-31

## 2022-03-20 PROBLEM — J45.30 MILD PERSISTENT ASTHMA WITHOUT COMPLICATION: Status: ACTIVE | Noted: 2021-03-02

## 2022-05-23 ENCOUNTER — APPOINTMENT (OUTPATIENT)
Dept: CT IMAGING | Age: 82
End: 2022-05-23
Payer: MEDICARE

## 2022-05-23 ENCOUNTER — HOSPITAL ENCOUNTER (EMERGENCY)
Age: 82
Discharge: HOME | End: 2022-05-23
Attending: EMERGENCY MEDICINE
Payer: MEDICARE

## 2022-05-23 ENCOUNTER — HOSPITAL ENCOUNTER (EMERGENCY)
Dept: GENERAL RADIOLOGY | Age: 82
Discharge: HOME | End: 2022-05-26
Payer: MEDICARE

## 2022-05-23 VITALS
HEIGHT: 59 IN | HEART RATE: 88 BPM | RESPIRATION RATE: 18 BRPM | WEIGHT: 200.2 LBS | OXYGEN SATURATION: 95 % | SYSTOLIC BLOOD PRESSURE: 129 MMHG | DIASTOLIC BLOOD PRESSURE: 65 MMHG | BODY MASS INDEX: 40.36 KG/M2 | TEMPERATURE: 98 F

## 2022-05-23 DIAGNOSIS — M54.50 LOW BACK PAIN WITHOUT SCIATICA, UNSPECIFIED BACK PAIN LATERALITY, UNSPECIFIED CHRONICITY: Primary | ICD-10-CM

## 2022-05-23 LAB
APPEARANCE UR: CLEAR
BACTERIA URNS QL MICRO: 0 /HPF
BILIRUB UR QL: NEGATIVE
COLOR UR: YELLOW
GLUCOSE UR STRIP.AUTO-MCNC: NEGATIVE MG/DL
HGB UR QL STRIP: NEGATIVE
KETONES UR QL STRIP.AUTO: NEGATIVE MG/DL
LEUKOCYTE ESTERASE UR QL STRIP.AUTO: NEGATIVE
NITRITE UR QL STRIP.AUTO: NEGATIVE
PH UR STRIP: 6 [PH] (ref 5–9)
PROT UR STRIP-MCNC: NEGATIVE MG/DL
SP GR UR REFRACTOMETRY: 1.01 (ref 1–1.02)
UROBILINOGEN UR QL STRIP.AUTO: 0.2 EU/DL (ref 0.2–1)

## 2022-05-23 PROCEDURE — 81003 URINALYSIS AUTO W/O SCOPE: CPT

## 2022-05-23 PROCEDURE — 72100 X-RAY EXAM L-S SPINE 2/3 VWS: CPT

## 2022-05-23 PROCEDURE — 72131 CT LUMBAR SPINE W/O DYE: CPT

## 2022-05-23 PROCEDURE — 99284 EMERGENCY DEPT VISIT MOD MDM: CPT

## 2022-05-23 RX ORDER — LIDOCAINE 50 MG/G
1 PATCH TOPICAL EVERY 24 HOURS
Qty: 30 PATCH | Refills: 0 | Status: SHIPPED | OUTPATIENT
Start: 2022-05-23 | End: 2022-06-22

## 2022-05-23 RX ORDER — IBUPROFEN 600 MG/1
600 TABLET ORAL EVERY 6 HOURS PRN
Qty: 28 TABLET | Refills: 0 | Status: SHIPPED | OUTPATIENT
Start: 2022-05-23 | End: 2022-05-30

## 2022-05-23 ASSESSMENT — PAIN - FUNCTIONAL ASSESSMENT
PAIN_FUNCTIONAL_ASSESSMENT: 0-10

## 2022-05-23 ASSESSMENT — ENCOUNTER SYMPTOMS: BACK PAIN: 1

## 2022-05-23 ASSESSMENT — PAIN DESCRIPTION - ORIENTATION
ORIENTATION: LOWER;MID
ORIENTATION: LOWER;MID
ORIENTATION: LOWER

## 2022-05-23 ASSESSMENT — PAIN SCALES - GENERAL
PAINLEVEL_OUTOF10: 10
PAINLEVEL_OUTOF10: 10
PAINLEVEL_OUTOF10: 7

## 2022-05-23 ASSESSMENT — PAIN DESCRIPTION - PAIN TYPE
TYPE: ACUTE PAIN
TYPE: ACUTE PAIN

## 2022-05-23 ASSESSMENT — PAIN DESCRIPTION - LOCATION
LOCATION: BACK

## 2022-05-23 ASSESSMENT — PAIN DESCRIPTION - DESCRIPTORS
DESCRIPTORS: ACHING;DULL
DESCRIPTORS: SHARP

## 2022-05-23 ASSESSMENT — PAIN DESCRIPTION - FREQUENCY: FREQUENCY: CONTINUOUS

## 2022-05-23 NOTE — ED TRIAGE NOTES
Pt arrives from [address] via [place]. Called to facility for low back pain that started last Thursday. Denies urinary pain, incontinence or frequency.   Denies fall, lifting anything heavy, abdominal pain, n/v/d.    83  94% RA  134/92  10/10

## 2022-05-23 NOTE — ED PROVIDER NOTES
[facility] Emergency Department Provider Note                   PCP:                Gal Evans MD               Age: 80 y.o. Sex: female     No diagnosis found. DISPOSITION         New Prescriptions    No medications on file       Orders Placed This Encounter   Procedures    XR LUMBAR SPINE (2-3 VIEWS)    CT LUMBAR SPINE WO CONTRAST    Urinalysis        MDM  Number of Diagnoses or Management Options  Diagnosis management comments: Patient has no acute fracture on CT and UA is negative. We will DC home with anti-inflammatories and outpatient follow-up with her PCP. Amount and/or Complexity of Data Reviewed  Clinical lab tests: ordered and reviewed  Tests in the radiology section of CPT®: ordered and reviewed  Decide to obtain previous medical records or to obtain history from someone other than the patient: yes  Review and summarize past medical records: yes  Independent visualization of images, tracings, or specimens: yes    Risk of Complications, Morbidity, and/or Mortality  Presenting problems: moderate  Management options: moderate    Patient Progress  Patient progress: improved       Yennifer Hairston is a 80 y.o. female who presents to the Emergency Department with chief complaint of    Chief Complaint   Patient presents with    Back Pain      Patient is a 81-year-old female with a history of chronic back pain who presents with lower back pain worsening last 3 days. Patient states at times she has had this. Patient denies any weakness of her lower extremities denies any bowel or bladder dysfunction. Patient denies any numbness or tingling of her lower extremities. Patient denies any abdominal pain patient states the pain is a dull achy sensation 2 out of 10 worse with movement as well as touch. All other systems reviewed and are negative. Review of Systems   Musculoskeletal: Positive for back pain. All other systems reviewed and are negative.       Past Medical History:   Diagnosis Date    Acquired spondylolisthesis     L4-5    Acquired spondylolisthesis of lumbosacral region 4/12/2016    Arthritis     Asthma     Chronic cystitis 1/8/2015    Controlled type 2 diabetes mellitus with diabetic polyneuropathy, without long-term current use of insulin ([address] 5/31/2017    Diabetes mellitus type 2, controlled ([address] 7/8/2009    Dyslipidemia, goal LDL below 70 7/21/2015    Essential hypertension with goal blood pressure less than 130/80 7/8/2009    Fibromyalgia 7/8/2009    GERD (gastroesophageal reflux disease) 7/8/2009    High cholesterol     Hx of thyroid nodule     resulting in partial thyroidectomy, managed with medication     Lumbar stenosis with neurogenic claudication     Mild intermittent asthma without complication 8/0/2665    IgE and [place]: 5/11/11, IgE 10, only allergy dog dander.  Mitral valve insufficiency 7/27/2015    Morbid obesity with BMI of 40.0-44.9, adult ([facility] 12/21/2013    Nausea & vomiting     after anesthesia    ARELIS on CPAP 5/23/2013    On CPAP, followed by Dr. Luisito Boothe     Peripheral neuropathy     feet, managed with medication     Postmenopausal atrophic vaginitis 1/8/2015    Refractory angina ([address] 11/30/2020    Added automatically from request for surgery [id number]    Unstable bladder 7/8/2009    Vitamin D deficiency 8/18/2015        Past Surgical History:   Procedure Laterality Date    BACK SURGERY  04/2016    L4-5    BREAST BIOPSY Right 1985?     right breast cyst removed    BREAST LUMPECTOMY Left 1984    left breast benign tumor removed    CARPAL TUNNEL RELEASE Bilateral 2009    CHOLECYSTECTOMY      CHOLECYSTECTOMY, LAPAROSCOPIC  1995    lap bin    COLONOSCOPY  02/2019    LUMBAR LAMINECTOMY  2005    Repair of L4 & L5 disc    ORTHOPEDIC SURGERY Right 1993    right foot plantar faciitis surgery    ORTHOPEDIC SURGERY Right 1997    right hand/ trigger finger release    DC APPENDECTOMY  2007    MATT AND BSO [address], PARTIAL Right May, 2013    Right lobe removed for nodules    TOTAL KNEE ARTHROPLASTY Bilateral 2009    UPPER GASTROINTESTINAL ENDOSCOPY  02/2019    UROLOGICAL SURGERY  2008    bladder band and cystocele repair         Family History   Problem Relation Age of Onset    Heart Disease Mother     Hypertension Mother     Breast Cancer Maternal Aunt     Lung Disease Father         Emphysema, smoker    Lung Disease Brother         COPD, smoker    Cancer Mother         kidney           Social Connections:     Frequency of Communication with Friends and Family: Not on file    Frequency of Social Gatherings with Friends and Family: Not on file    Attends Christian Services: Not on file    Active Member of Clubs or Organizations: Not on file    Attends Club or Organization Meetings: Not on file    Marital Status: Not on file        Allergies   Allergen Reactions    Adhesive Tape Other (See Comments)     Causes \"skin to peel\" and \"redness\". Ok to use Paper Tape per pt.  Morphine Itching and Nausea Only    Penicillins Other (See Comments)     Pt not sure of reaction - as a child.  Codeine Nausea And Vomiting    Povidone-Iodine Other (See Comments) and Rash     Caused \"skin to peel\" per pt.  Sulfa Antibiotics Itching and Rash     \"some\" of the sulfa drugs cause this reaction per pt. Vitals signs and nursing note reviewed. Patient Vitals for the past 4 hrs:   Temp Pulse Resp BP SpO2   05/23/22 1514 -- 87 20 (!) 141/69 95 %   05/23/22 1340 97.9 °F (36.6 °C) 90 22 (!) 143/81 93 %          Physical Exam  Vitals and nursing note reviewed. Constitutional:       Appearance: Normal appearance. HENT:      Head: Normocephalic and atraumatic. Nose: Nose normal.      Mouth/Throat:      Mouth: Mucous membranes are dry. Pharynx: Oropharynx is clear. Eyes:      Extraocular Movements: Extraocular movements intact.       Conjunctiva/sclera: Conjunctivae normal. Pupils: Pupils are equal, round, and reactive to light. Cardiovascular:      Rate and Rhythm: Normal rate. Pulses: Normal pulses. Pulmonary:      Effort: Pulmonary effort is normal.   Abdominal:      General: Abdomen is flat. Palpations: Abdomen is soft. Musculoskeletal:      Cervical back: Normal range of motion and neck supple. Comments: Lower paraspinal TTP; no midline TTP;   Skin:     Capillary Refill: Capillary refill takes less than 2 seconds. Neurological:      General: No focal deficit present. Mental Status: She is alert and oriented to person, place, and time. Mental status is at baseline. Psychiatric:         Mood and Affect: Mood normal.         Behavior: Behavior normal.         Thought Content: Thought content normal.         Judgment: Judgment normal.          Procedures    Labs Reviewed   URINALYSIS        XR LUMBAR SPINE (2-3 VIEWS)   Final Result   Multilevel lumbar spondylosis, with slight interval progression. No   evidence of hardware complication. CT LUMBAR SPINE WO CONTRAST    (Results Pending)            Mihir Coma Scale  Eye Opening: Spontaneous  Best Verbal Response: Oriented  Best Motor Response: Obeys commands  Mihir Coma Scale Score: 15                     Voice dictation software was used during the making of this note. This software is not perfect and grammatical and other typographical errors may be present. This note has not been completely proofread for errors.         Al Philippe MD  05/23/22 3441

## 2023-04-13 ENCOUNTER — HOSPITAL ENCOUNTER (EMERGENCY)
Age: 83
Discharge: HOME | End: 2023-04-13
Attending: EMERGENCY MEDICINE
Payer: MEDICARE

## 2023-04-13 ENCOUNTER — APPOINTMENT (OUTPATIENT)
Dept: GENERAL RADIOLOGY | Age: 83
End: 2023-04-13
Payer: MEDICARE

## 2023-04-13 VITALS
DIASTOLIC BLOOD PRESSURE: 67 MMHG | HEART RATE: 94 BPM | WEIGHT: 190.5 LBS | TEMPERATURE: 98.4 F | HEIGHT: 59 IN | BODY MASS INDEX: 38.4 KG/M2 | OXYGEN SATURATION: 92 % | SYSTOLIC BLOOD PRESSURE: 118 MMHG | RESPIRATION RATE: 23 BRPM

## 2023-04-13 DIAGNOSIS — J06.9 ACUTE UPPER RESPIRATORY INFECTION: Primary | ICD-10-CM

## 2023-04-13 DIAGNOSIS — B34.8 PARAINFLUENZA INFECTION: ICD-10-CM

## 2023-04-13 LAB
ALBUMIN SERPL-MCNC: 3.2 G/DL (ref 3.2–4.6)
ALBUMIN/GLOB SERPL: 0.8 (ref 0.4–1.6)
ALP SERPL-CCNC: 52 U/L (ref 50–136)
ALT SERPL-CCNC: 28 U/L (ref 12–65)
ANION GAP SERPL CALC-SCNC: 5 MMOL/L (ref 2–11)
APPEARANCE UR: CLEAR
AST SERPL-CCNC: 51 U/L (ref 15–37)
BASOPHILS # BLD: 0 K/UL (ref 0–0.2)
BASOPHILS NFR BLD: 1 % (ref 0–2)
BILIRUB SERPL-MCNC: 0.4 MG/DL (ref 0.2–1.1)
BILIRUB UR QL: NEGATIVE
BUN SERPL-MCNC: 15 MG/DL (ref 8–23)
CALCIUM SERPL-MCNC: 8.8 MG/DL (ref 8.3–10.4)
CHLORIDE SERPL-SCNC: 109 MMOL/L (ref 101–110)
CO2 SERPL-SCNC: 24 MMOL/L (ref 21–32)
COLOR UR: NORMAL
CREAT SERPL-MCNC: 1 MG/DL (ref 0.6–1)
DIFFERENTIAL METHOD BLD: ABNORMAL
EOSINOPHIL # BLD: 0.4 K/UL (ref 0–0.8)
EOSINOPHIL NFR BLD: 6 % (ref 0.5–7.8)
ERYTHROCYTE [DISTWIDTH] IN BLOOD BY AUTOMATED COUNT: 14.3 % (ref 11.9–14.6)
FLUAV RNA SPEC QL NAA+PROBE: NOT DETECTED
FLUBV RNA SPEC QL NAA+PROBE: NOT DETECTED
GLOBULIN SER CALC-MCNC: 4.2 G/DL (ref 2.8–4.5)
GLUCOSE SERPL-MCNC: 102 MG/DL (ref 65–100)
GLUCOSE UR STRIP.AUTO-MCNC: NEGATIVE MG/DL
HCT VFR BLD AUTO: 36.1 % (ref 35.8–46.3)
HGB BLD-MCNC: 11.3 G/DL (ref 11.7–15.4)
HGB UR QL STRIP: NEGATIVE
IMM GRANULOCYTES # BLD AUTO: 0.1 K/UL (ref 0–0.5)
IMM GRANULOCYTES NFR BLD AUTO: 1 % (ref 0–5)
KETONES UR QL STRIP.AUTO: NEGATIVE MG/DL
LACTATE SERPL-SCNC: 1 MMOL/L (ref 0.4–2)
LEUKOCYTE ESTERASE UR QL STRIP.AUTO: NEGATIVE
LYMPHOCYTES # BLD: 1 K/UL (ref 0.5–4.6)
LYMPHOCYTES NFR BLD: 13 % (ref 13–44)
MCH RBC QN AUTO: 28 PG (ref 26.1–32.9)
MCHC RBC AUTO-ENTMCNC: 31.3 G/DL (ref 31.4–35)
MCV RBC AUTO: 89.4 FL (ref 82–102)
MONOCYTES # BLD: 0.7 K/UL (ref 0.1–1.3)
MONOCYTES NFR BLD: 10 % (ref 4–12)
NEUTS SEG # BLD: 5.4 K/UL (ref 1.7–8.2)
NEUTS SEG NFR BLD: 71 % (ref 43–78)
NITRITE UR QL STRIP.AUTO: NEGATIVE
NRBC # BLD: 0 K/UL (ref 0–0.2)
PH UR STRIP: 5.5 (ref 5–9)
PLATELET # BLD AUTO: 207 K/UL (ref 150–450)
PMV BLD AUTO: 9.1 FL (ref 9.4–12.3)
POTASSIUM SERPL-SCNC: 3.9 MMOL/L (ref 3.5–5.1)
PROCALCITONIN SERPL-MCNC: 0.05 NG/ML (ref 0–0.49)
PROT SERPL-MCNC: 7.4 G/DL (ref 6.3–8.2)
PROT UR STRIP-MCNC: NEGATIVE MG/DL
RBC # BLD AUTO: 4.04 M/UL (ref 4.05–5.2)
SARS-COV-2 RDRP RESP QL NAA+PROBE: NOT DETECTED
SODIUM SERPL-SCNC: 138 MMOL/L (ref 133–143)
SOURCE: NORMAL
SP GR UR REFRACTOMETRY: 1.02 (ref 1–1.02)
UROBILINOGEN UR QL STRIP.AUTO: 1 EU/DL (ref 0.2–1)
WBC # BLD AUTO: 7.6 K/UL (ref 4.3–11.1)

## 2023-04-13 PROCEDURE — 80053 COMPREHEN METABOLIC PANEL: CPT

## 2023-04-13 PROCEDURE — 99285 EMERGENCY DEPT VISIT HI MDM: CPT

## 2023-04-13 PROCEDURE — 0202U NFCT DS 22 TRGT SARS-COV-2: CPT

## 2023-04-13 PROCEDURE — 6370000000 HC RX 637 (ALT 250 FOR IP): Performed by: EMERGENCY MEDICINE

## 2023-04-13 PROCEDURE — 83605 ASSAY OF LACTIC ACID: CPT

## 2023-04-13 PROCEDURE — 87502 INFLUENZA DNA AMP PROBE: CPT

## 2023-04-13 PROCEDURE — 81003 URINALYSIS AUTO W/O SCOPE: CPT

## 2023-04-13 PROCEDURE — 87040 BLOOD CULTURE FOR BACTERIA: CPT

## 2023-04-13 PROCEDURE — 2580000003 HC RX 258: Performed by: EMERGENCY MEDICINE

## 2023-04-13 PROCEDURE — 85025 COMPLETE CBC W/AUTO DIFF WBC: CPT

## 2023-04-13 PROCEDURE — 84145 PROCALCITONIN (PCT): CPT

## 2023-04-13 PROCEDURE — 71045 X-RAY EXAM CHEST 1 VIEW: CPT

## 2023-04-13 PROCEDURE — 87635 SARS-COV-2 COVID-19 AMP PRB: CPT

## 2023-04-13 RX ORDER — ACETAMINOPHEN 500 MG
1000 TABLET ORAL EVERY 6 HOURS PRN
Qty: 30 TABLET | Refills: 0 | Status: SHIPPED | OUTPATIENT
Start: 2023-04-13

## 2023-04-13 RX ORDER — IBUPROFEN 800 MG/1
800 TABLET ORAL EVERY 8 HOURS PRN
Qty: 30 TABLET | Refills: 0 | Status: SHIPPED | OUTPATIENT
Start: 2023-04-13

## 2023-04-13 RX ORDER — ACETAMINOPHEN 500 MG
1000 TABLET ORAL
Status: COMPLETED | OUTPATIENT
Start: 2023-04-13 | End: 2023-04-13

## 2023-04-13 RX ORDER — IBUPROFEN 800 MG/1
800 TABLET ORAL
Status: COMPLETED | OUTPATIENT
Start: 2023-04-13 | End: 2023-04-13

## 2023-04-13 RX ORDER — SODIUM CHLORIDE, SODIUM LACTATE, POTASSIUM CHLORIDE, AND CALCIUM CHLORIDE .6; .31; .03; .02 G/100ML; G/100ML; G/100ML; G/100ML
1000 INJECTION, SOLUTION INTRAVENOUS
Status: COMPLETED | OUTPATIENT
Start: 2023-04-13 | End: 2023-04-13

## 2023-04-13 RX ADMIN — ACETAMINOPHEN 1000 MG: 500 TABLET, FILM COATED ORAL at 17:35

## 2023-04-13 RX ADMIN — SODIUM CHLORIDE, POTASSIUM CHLORIDE, SODIUM LACTATE AND CALCIUM CHLORIDE 1000 ML: 600; 310; 30; 20 INJECTION, SOLUTION INTRAVENOUS at 17:08

## 2023-04-13 RX ADMIN — IBUPROFEN 800 MG: 800 TABLET, FILM COATED ORAL at 19:48

## 2023-04-13 ASSESSMENT — PAIN - FUNCTIONAL ASSESSMENT: PAIN_FUNCTIONAL_ASSESSMENT: NONE - DENIES PAIN

## 2023-04-13 ASSESSMENT — ENCOUNTER SYMPTOMS
PHOTOPHOBIA: 0
CHEST TIGHTNESS: 0
COUGH: 1
VOICE CHANGE: 0
SORE THROAT: 1
SINUS CONGESTION: 1
VOMITING: 0
NAUSEA: 0
TROUBLE SWALLOWING: 0
BACK PAIN: 0
COLOR CHANGE: 0
EYE REDNESS: 0

## 2023-04-13 NOTE — ED PROVIDER NOTES
- 12.0 %    Eosinophils % 6 0.5 - 7.8 %    Basophils 1 0.0 - 2.0 %    Immature Granulocytes 1 0.0 - 5.0 %    Segs Absolute 5.4 1.7 - 8.2 K/UL    Absolute Lymph # 1.0 0.5 - 4.6 K/UL    Absolute Mono # 0.7 0.1 - 1.3 K/UL    Absolute Eos # 0.4 0.0 - 0.8 K/UL    Basophils Absolute 0.0 0.0 - 0.2 K/UL    Absolute Immature Granulocyte 0.1 0.0 - 0.5 K/UL   CMP   Result Value Ref Range    Sodium 138 133 - 143 mmol/L    Potassium 3.9 3.5 - 5.1 mmol/L    Chloride 109 101 - 110 mmol/L    CO2 24 21 - 32 mmol/L    Anion Gap 5 2 - 11 mmol/L    Glucose 102 (H) 65 - 100 mg/dL    BUN 15 8 - 23 MG/DL    Creatinine 1.00 0.6 - 1.0 MG/DL    Est, Glom Filt Rate 57 (L) >60 ml/min/1.73m2    Calcium 8.8 8.3 - 10.4 MG/DL    Total Bilirubin 0.4 0.2 - 1.1 MG/DL    ALT 28 12 - 65 U/L    AST 51 (H) 15 - 37 U/L    Alk Phosphatase 52 50 - 136 U/L    Total Protein 7.4 6.3 - 8.2 g/dL    Albumin 3.2 3.2 - 4.6 g/dL    Globulin 4.2 2.8 - 4.5 g/dL    Albumin/Globulin Ratio 0.8 0.4 - 1.6     Procalcitonin   Result Value Ref Range    Procalcitonin 0.05 0.00 - 0.49 ng/mL   Urinalysis w rflx microscopic   Result Value Ref Range    Color, UA YELLOW/STRAW      Appearance CLEAR      Specific Gravity, UA 1.018 1.001 - 1.023      pH, Urine 5.5 5.0 - 9.0      Protein, UA Negative NEG mg/dL    Glucose, UA Negative mg/dL    Ketones, Urine Negative NEG mg/dL    Bilirubin Urine Negative NEG      Blood, Urine Negative NEG      Urobilinogen, Urine 1.0 0.2 - 1.0 EU/dL    Nitrite, Urine Negative NEG      Leukocyte Esterase, Urine Negative NEG          XR CHEST PORTABLE   Final Result      Enlarged cardiomediastinal silhouette. Thank you for the referral of this patient. This exam was interpreted by an    [employer] certified radiologist with subspecialty fellowship    training in body imaging. If there are any questions regarding this exam    please feel free to contact a radiologist directly at [phone].       Slot: [phone],

## 2023-04-13 NOTE — ED TRIAGE NOTES
Pt to ED from [address] [place] [address]. Pt checked O2 and facility called out due to saturations in the 80s. Hx of asthma. O2 with EMS was 97 % on room air, pt 92% room air in triage. Pt c/o cough and congestion for 2 days. Pt denies SOB, NVD, chills, or chest pain at this time. Pt states nonproductive cough.   BP: 146/70  HR: 98  O2: 97% room air  BBGL: 111  Temp: 100.3 oral

## 2023-04-14 LAB

## 2023-04-14 NOTE — DISCHARGE INSTRUCTIONS
Drink plenty of fluids  Take Tylenol 1000 mg or ibuprofen 800 mg every 4-6 hours as needed for fever or body aches  Follow-up with your primary care physician  Return to the ER for any new, worsening or life-threatening symptoms

## 2023-04-14 NOTE — ED NOTES
I have reviewed discharge instructions with the patient. The patient verbalized understanding. Patient left ED via Discharge Method: wheelchair to Home with daughter. Opportunity for questions and clarification provided. Patient given 0 scripts. To continue your aftercare when you leave the hospital, you may receive an automated call from our care team to check in on how you are doing. This is a free service and part of our promise to provide the best care and service to meet your aftercare needs.  If you have questions, or wish to unsubscribe from this service please call [phone]. Thank you for Choosing our [facility] Emergency Department.         Marin Augustin RN  04/13/23 [id number]

## 2023-04-18 LAB
BACTERIA SPEC CULT: NORMAL
BACTERIA SPEC CULT: NORMAL
SERVICE CMNT-IMP: NORMAL
SERVICE CMNT-IMP: NORMAL

## 2023-04-18 NOTE — PROGRESS NOTES
ED pharmacist successfully contacted [facility] on 04/18/23 regarding the recent results of their respiratory virus panel. The patient has been informed of their results and educated therapy management, if warranted. Sabina Stanton, PharmD.   Emergency Medicine Clinical Pharmacist